# Patient Record
Sex: FEMALE | URBAN - METROPOLITAN AREA
[De-identification: names, ages, dates, MRNs, and addresses within clinical notes are randomized per-mention and may not be internally consistent; named-entity substitution may affect disease eponyms.]

---

## 2018-11-05 ENCOUNTER — APPOINTMENT (RX ONLY)
Dept: URBAN - METROPOLITAN AREA CLINIC 149 | Facility: CLINIC | Age: 38
Setting detail: DERMATOLOGY
End: 2018-11-05

## 2018-11-05 PROBLEM — L70.0 ACNE VULGARIS: Status: ACTIVE | Noted: 2018-11-05

## 2018-11-05 PROBLEM — D48.5 NEOPLASM OF UNCERTAIN BEHAVIOR OF SKIN: Status: ACTIVE | Noted: 2018-11-05

## 2018-11-05 PROCEDURE — ? DEFER

## 2018-11-05 PROCEDURE — 99201: CPT

## 2018-11-05 NOTE — PROCEDURE: DEFER
Detail Level: Detailed
Instructions (Optional): Refer back to PCP for consult for ocular-plastics surgeon for Bx and removal. Pt given note to give to PCP\\nPicture taken today

## 2022-07-09 ENCOUNTER — TELEPHONE ENCOUNTER (OUTPATIENT)
Dept: URBAN - METROPOLITAN AREA CLINIC 121 | Facility: CLINIC | Age: 42
End: 2022-07-09

## 2022-07-09 RX ORDER — ONDANSETRON HYDROCHLORIDE 4 MG/2ML
TAKE 1 TAB PO Q 6-8 HRS PRN NAUSEA INJECTION, SOLUTION INTRAMUSCULAR; INTRAVENOUS TAKE AS DIRECTED
Refills: 1 | OUTPATIENT
Start: 2015-12-17 | End: 2016-04-11

## 2022-07-09 RX ORDER — PROMETHAZINE HYDROCHLORIDE 25 MG/ML
INJECTION INTRAMUSCULAR; INTRAVENOUS AS NEEDED
Refills: 0 | OUTPATIENT
Start: 2015-12-17 | End: 2016-04-11

## 2022-07-09 RX ORDER — LIDOCAINE 50 MG/G
OINTMENT TOPICAL
Refills: 0 | OUTPATIENT
Start: 2015-10-23 | End: 2015-12-17

## 2022-07-09 RX ORDER — FAMOTIDINE 20 MG/1
TABLET ORAL ONCE A DAY
Refills: 0 | OUTPATIENT
Start: 2015-10-23 | End: 2015-12-17

## 2022-07-09 RX ORDER — PROMETHAZINE HYDROCHLORIDE 25 MG/ML
INJECTION INTRAMUSCULAR; INTRAVENOUS TAKE AS DIRECTED
Refills: 0 | OUTPATIENT
Start: 2015-03-25 | End: 2015-04-20

## 2022-07-09 RX ORDER — PROMETHAZINE HYDROCHLORIDE 25 MG/ML
INJECTION INTRAMUSCULAR; INTRAVENOUS AS NEEDED
Refills: 0 | OUTPATIENT
Start: 2015-10-23 | End: 2015-12-17

## 2022-07-09 RX ORDER — PROMETHAZINE HYDROCHLORIDE 25 MG/1
TABLET ORAL
Refills: 0 | OUTPATIENT
Start: 2016-05-13 | End: 2016-05-13

## 2022-07-09 RX ORDER — PROMETHAZINE HYDROCHLORIDE 25 MG/ML
INJECTION INTRAMUSCULAR; INTRAVENOUS AS NEEDED
Refills: 0 | OUTPATIENT
Start: 2015-04-20 | End: 2015-10-23

## 2022-07-09 RX ORDER — LIDOCAINE 50 MG/G
OINTMENT TOPICAL
Refills: 0 | OUTPATIENT
Start: 2016-04-11 | End: 2016-04-11

## 2022-07-09 RX ORDER — LIDOCAINE 50 MG/G
OINTMENT TOPICAL
Refills: 0 | OUTPATIENT
Start: 2015-12-17 | End: 2016-04-11

## 2022-07-09 RX ORDER — PROMETHAZINE HYDROCHLORIDE 25 MG/1
TABLET ORAL
Refills: 0 | OUTPATIENT
Start: 2016-04-11 | End: 2016-05-13

## 2022-07-09 RX ORDER — PROMETHAZINE HYDROCHLORIDE 25 MG/ML
INJECTION INTRAMUSCULAR; INTRAVENOUS AS NEEDED
Refills: 0 | OUTPATIENT
Start: 2016-04-11 | End: 2016-04-11

## 2022-07-09 RX ORDER — OMEPRAZOLE 40 MG/1
TAKE  1 TAB PO QAM 30-60 MINS BEFORE BREAKFAST CAPSULE, DELAYED RELEASE ORAL ONCE A DAY
Refills: 1 | OUTPATIENT
Start: 2015-03-25 | End: 2016-04-11

## 2022-07-09 RX ORDER — LIDOCAINE 50 MG/G
OINTMENT TOPICAL
Refills: 0 | OUTPATIENT
Start: 2015-09-11 | End: 2015-10-23

## 2022-07-09 RX ORDER — ESOMEPRAZOLE MAGNESIUM 40 MG
ONCE A DAY CAPSULE,DELAYED RELEASE (ENTERIC COATED) ORAL ONCE A DAY
Refills: 2 | OUTPATIENT
Start: 2016-04-11 | End: 2016-05-13

## 2022-07-09 RX ORDER — FAMOTIDINE 20 MG/1
TABLET ORAL ONCE A DAY
Refills: 0 | OUTPATIENT
Start: 2015-03-25 | End: 2015-04-20

## 2022-07-09 RX ORDER — CHLORHEXIDINE GLUCONATE 4 %
LIQUID (ML) TOPICAL ONCE A DAY
Refills: 0 | OUTPATIENT
Start: 2016-04-11 | End: 2016-05-13

## 2022-07-09 RX ORDER — FAMOTIDINE 20 MG/1
TABLET ORAL ONCE A DAY
Refills: 0 | OUTPATIENT
Start: 2016-04-11 | End: 2016-04-11

## 2022-07-09 RX ORDER — FAMOTIDINE 20 MG/1
TABLET ORAL ONCE A DAY
Refills: 0 | OUTPATIENT
Start: 2015-04-20 | End: 2015-10-23

## 2022-07-09 RX ORDER — PANTOPRAZOLE SODIUM 40 MG/1
TABLET, DELAYED RELEASE ORAL ONCE A DAY
Refills: 0 | OUTPATIENT
Start: 2016-05-13 | End: 2016-05-13

## 2022-07-09 RX ORDER — PANTOPRAZOLE SODIUM 40 MG/1
TABLET, DELAYED RELEASE ORAL ONCE A DAY
Refills: 0 | OUTPATIENT
Start: 2016-04-11 | End: 2016-05-13

## 2022-07-09 RX ORDER — FAMOTIDINE 20 MG/1
TABLET ORAL ONCE A DAY
Refills: 0 | OUTPATIENT
Start: 2015-12-17 | End: 2016-04-11

## 2022-07-10 ENCOUNTER — TELEPHONE ENCOUNTER (OUTPATIENT)
Dept: URBAN - METROPOLITAN AREA CLINIC 121 | Facility: CLINIC | Age: 42
End: 2022-07-10

## 2022-07-10 RX ORDER — SUCRALFATE 1 G/1
FOUR TIMES A DAY TABLET ORAL
Refills: 2 | Status: ACTIVE | COMMUNITY
Start: 2015-04-20

## 2022-07-10 RX ORDER — CHLORHEXIDINE GLUCONATE 4 %
LIQUID (ML) TOPICAL ONCE A DAY
Refills: 0 | Status: ACTIVE | COMMUNITY
Start: 2016-05-13

## 2022-07-10 RX ORDER — ESOMEPRAZOLE MAGNESIUM 20 MG
CAPSULE,DELAYED RELEASE (ENTERIC COATED) ORAL ONCE A DAY
Refills: 0 | Status: ACTIVE | COMMUNITY
Start: 2016-05-13

## 2022-07-10 RX ORDER — PROMETHAZINE HYDROCHLORIDE 25 MG/1
FOUR TIMES A DAY TABLET ORAL
Refills: 2 | Status: ACTIVE | COMMUNITY
Start: 2016-04-11

## 2022-07-10 RX ORDER — OMEPRAZOLE 40 MG/1
ONCE A DAY CAPSULE, DELAYED RELEASE ORAL ONCE A DAY
Refills: 2 | Status: ACTIVE | COMMUNITY
Start: 2015-04-20